# Patient Record
Sex: MALE | Race: ASIAN | NOT HISPANIC OR LATINO
[De-identification: names, ages, dates, MRNs, and addresses within clinical notes are randomized per-mention and may not be internally consistent; named-entity substitution may affect disease eponyms.]

---
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
## 2023-11-21 ENCOUNTER — EMERGENCY (EMERGENCY)
Facility: HOSPITAL | Age: 23
LOS: 0 days | Discharge: HOME | End: 2023-11-21
Attending: EMERGENCY MEDICINE
Payer: COMMERCIAL

## 2023-11-21 VITALS
SYSTOLIC BLOOD PRESSURE: 103 MMHG | RESPIRATION RATE: 19 BRPM | WEIGHT: 149.91 LBS | HEIGHT: 74 IN | HEART RATE: 71 BPM | OXYGEN SATURATION: 98 % | DIASTOLIC BLOOD PRESSURE: 71 MMHG | TEMPERATURE: 98 F

## 2023-11-21 VITALS
DIASTOLIC BLOOD PRESSURE: 72 MMHG | RESPIRATION RATE: 20 BRPM | OXYGEN SATURATION: 98 % | HEART RATE: 67 BPM | SYSTOLIC BLOOD PRESSURE: 114 MMHG | TEMPERATURE: 98 F
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
## 2023-11-21 DIAGNOSIS — M79.18 MYALGIA, OTHER SITE: ICD-10-CM

## 2023-11-21 DIAGNOSIS — S29.012A STRAIN OF MUSCLE AND TENDON OF BACK WALL OF THORAX, INITIAL ENCOUNTER: ICD-10-CM
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
## 2023-11-21 PROCEDURE — 99284 EMERGENCY DEPT VISIT MOD MDM: CPT

## 2023-11-21 RX ORDER — IBUPROFEN 200 MG
1 TABLET ORAL
Qty: 20 | Refills: 0
Start: 2023-11-21 | End: 2023-11-25

## 2023-11-21 RX ORDER — CYCLOBENZAPRINE HYDROCHLORIDE 10 MG/1
10 TABLET, FILM COATED ORAL ONCE
Refills: 0 | Status: COMPLETED | OUTPATIENT
Start: 2023-11-21 | End: 2023-11-21

## 2023-11-21 RX ORDER — CYCLOBENZAPRINE HYDROCHLORIDE 10 MG/1
1 TABLET, FILM COATED ORAL
Qty: 15 | Refills: 0
Start: 2023-11-21 | End: 2023-11-25

## 2023-11-21 RX ORDER — IBUPROFEN 200 MG
600 TABLET ORAL ONCE
Refills: 0 | Status: COMPLETED | OUTPATIENT
Start: 2023-11-21 | End: 2023-11-21

## 2023-11-21 RX ADMIN — Medication 600 MILLIGRAM(S): at 16:06

## 2023-11-21 RX ADMIN — CYCLOBENZAPRINE HYDROCHLORIDE 10 MILLIGRAM(S): 10 TABLET, FILM COATED ORAL at 16:06
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
## 2023-11-21 NOTE — ED PROVIDER NOTE - PATIENT PORTAL LINK FT
You can access the FollowMyHealth Patient Portal offered by [facility] by registering at the following website: [email or web address]. By joining [employer]’s FollowMyHealth portal, you will also be able to view your health information using other applications (apps) compatible with our system.
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
## 2024-10-21 ENCOUNTER — EMERGENCY (EMERGENCY)
Facility: HOSPITAL | Age: 24
LOS: 0 days | Discharge: HOME | End: 2024-10-21
Attending: EMERGENCY MEDICINE
Payer: COMMERCIAL

## 2024-10-21 VITALS
HEART RATE: 75 BPM | TEMPERATURE: 98 F | SYSTOLIC BLOOD PRESSURE: 137 MMHG | RESPIRATION RATE: 20 BRPM | WEIGHT: 154.98 LBS | OXYGEN SATURATION: 97 % | DIASTOLIC BLOOD PRESSURE: 82 MMHG | HEIGHT: 74 IN

## 2024-10-21 VITALS
SYSTOLIC BLOOD PRESSURE: 128 MMHG | DIASTOLIC BLOOD PRESSURE: 80 MMHG | RESPIRATION RATE: 19 BRPM | OXYGEN SATURATION: 98 % | HEART RATE: 71 BPM | TEMPERATURE: 98 F

## 2024-10-21 DIAGNOSIS — R21 RASH AND OTHER NONSPECIFIC SKIN ERUPTION: ICD-10-CM

## 2024-10-21 LAB
APPEARANCE UR: CLEAR — SIGNIFICANT CHANGE UP
BILIRUB UR-MCNC: NEGATIVE — SIGNIFICANT CHANGE UP
COLOR SPEC: YELLOW — SIGNIFICANT CHANGE UP
DIFF PNL FLD: NEGATIVE — SIGNIFICANT CHANGE UP
GLUCOSE UR QL: NEGATIVE MG/DL — SIGNIFICANT CHANGE UP
KETONES UR-MCNC: 15 MG/DL
LEUKOCYTE ESTERASE UR-ACNC: NEGATIVE — SIGNIFICANT CHANGE UP
NITRITE UR-MCNC: NEGATIVE — SIGNIFICANT CHANGE UP
PH UR: 5.5 — SIGNIFICANT CHANGE UP (ref 5–8)
PROT UR-MCNC: NEGATIVE MG/DL — SIGNIFICANT CHANGE UP
SP GR SPEC: 1.02 — SIGNIFICANT CHANGE UP (ref 1–1.03)
UROBILINOGEN FLD QL: 0.2 MG/DL — SIGNIFICANT CHANGE UP (ref 0.2–1)

## 2024-10-21 PROCEDURE — 99284 EMERGENCY DEPT VISIT MOD MDM: CPT

## 2024-10-21 RX ORDER — DOXYCYCLINE HYCLATE 100 MG
1 CAPSULE ORAL
Qty: 14 | Refills: 0
Start: 2024-10-21 | End: 2024-10-27

## 2024-10-21 RX ORDER — CEFTRIAXONE SODIUM 1 G
500 VIAL (EA) INJECTION ONCE
Refills: 0 | Status: COMPLETED | OUTPATIENT
Start: 2024-10-21 | End: 2024-10-21

## 2024-10-21 RX ORDER — DOXYCYCLINE HYCLATE 100 MG
100 CAPSULE ORAL ONCE
Refills: 0 | Status: COMPLETED | OUTPATIENT
Start: 2024-10-21 | End: 2024-10-21

## 2024-10-21 RX ADMIN — Medication 500 MILLIGRAM(S): at 18:51

## 2024-10-21 RX ADMIN — Medication 100 MILLIGRAM(S): at 18:51

## 2024-10-21 NOTE — ED PROVIDER NOTE - NSFOLLOWUPINSTRUCTIONS_ED_ALL_ED_FT
Safe Sex Practices    WHAT YOU NEED TO KNOW:    What are safe sex practices? Safe sex practices are ways to prevent pregnancy and the spread of sexually transmitted infections (STIs). An STI happens when a virus or bacteria are spread through sexual activity. Safe sex practices help decrease or prevent body fluid exchange during sex. Body fluids include saliva, urine, blood, vaginal fluids, and semen. Oral, vaginal, and anal sex can all spread STIs.    What are some safe sex practices to follow before I have sex?    Talk to a new partner before you have sex. Tell your partner if you have an STI. Ask about his or her sex history and if he or she has a current or past STI. Your partner may need to be tested and treated. Do not have sex while you are being treated for an STI, or with a partner who is being treated.    Limit your number of sex partners. More than one sex partner can increase your risk for an STI. Do not have sex with anyone whose sex history you do not know.    Get tested for STIs if needed. Get tested if you had sex with someone who has an STI. Get tested if you have unprotected sex with any new partner.    Talk to your healthcare provider about birth control. Birth control can help prevent an unwanted pregnancy. There are many different types of birth control. Talk to your healthcare provider about which birth control method is right for you.    Ask about medicines to lower your risk for some STIs:  Vaccines can help protect you from hepatitis A, hepatitis B, and the human papillomavirus (HPV). The HPV vaccine is usually given at 11 years, but it may be given through 26 years to both females and males. Your provider can give you more information on vaccines to prevent STIs.    Pre-exposure prophylaxis (PrEP) may be given if you are at high risk for HIV. PrEP is taken every day to prevent the virus from fully infecting the body.    Do not use alcohol or drugs before sex. These can prevent you from thinking clearly and increase your risk for unsafe sex.  What are some safe sex practices to follow while I am having sex?    Use condoms and barrier methods for all types of sexual contact. This includes oral, vaginal, and anal sex. Male and female condoms are available. Make sure that the condom fits and is put on correctly. Rubber latex sheets or dental dams can be used for oral sex. Use a new condom or latex barrier each time you have sex. Use latex condoms, if possible. [place] or natural condoms do not prevent STIs. If you or your partner is allergic to latex, use a nonlatex product, such as polyurethane. Use a second form of birth control with the condom to prevent pregnancy and STIs. Do not use male and female condoms together.    Only use water-based lubricants during sex. Water-based lubricants help prevent sores or cuts in the vagina or on the penis. Prevent sores or cuts to decrease your risk for an STI. Do not use oil-based lubricants, such as baby oil or hand lotion, with latex condoms or barriers. These will weaken the latex and may cause the condom to break.    Do not use chemicals that irritate your skin. Products that contain chemical irritants, such as spermicides, can irritate the lining of your vagina or rectum. Irritation may cause sores that can increase your risk for an STI.    Be careful when you have sex if you have open sores or cuts. Open sores or cuts may increase your risk for an STI. Keep all open sores or cuts covered during sex. Do not have oral sex if you have cuts or sores in your mouth.    Do not do activities that can pass germs. Do not use saliva as a lubricant or share sex toys.  Where can I find more information?    [employer] ([employer])  [address] [address]  [place] [address]  Web Address: http://www.ashastd.org  When should I seek immediate care?    A condom breaks, leaks, or slips off while you are having sex.    You notice sores on your penis, vagina, anal area, or the skin around them.    You have had unsafe sex and want to discuss emergency contraception or treatment for STI exposure.  When should I call my doctor?    You think you or your female sex partner might be pregnant.    You have questions or concerns about your condition or care.  CARE AGREEMENT:    You have the right to help plan your care. Learn about your health condition and how it may be treated. Discuss treatment options with your healthcare providers to decide what care you want to receive. You always have the right to refuse treatment.

## 2024-10-21 NOTE — ED PROVIDER NOTE - OBJECTIVE STATEMENT
24-year-old male with no significant past medical history presenting ER due to penile rash noted x 2 to 3 days.  Patient states that he has already had seen his primary and had RPR testing as well as HIV but no hepatitis testing.  Patient states that he is worried that the penile rash/lesion is from herpes.  Otherwise denies any pain or discomfort around penis but does states that he feels like there is some Recom/discharge from the penile meatus.

## 2024-10-21 NOTE — ED PROVIDER NOTE - CLINICAL SUMMARY MEDICAL DECISION MAKING FREE TEXT BOX
Patient with penile rash otherwise with noted bumps on otherwise will DC with outpatient neurology follow-up as needed.  Given hepatitis testing as well as treatment for possible chlamydia and gonorrhea with ceftriaxone and Doxy in ED.  Advised patient to get further testing or evaluation should he need to with a urologist.  Given rash on penis is nontender with no redness unlikely to be herpes but will test for it via bloodwork at this time. May do Tzanck smear as well outpt.

## 2024-10-21 NOTE — ED ADULT TRIAGE NOTE - CHIEF COMPLAINT QUOTE
Requesting STDs check,  herpes check ,c/o redness to penis no discharge, c/o fever and chills a few days ago. Recent un protected sex .

## 2024-10-21 NOTE — ED PROVIDER NOTE - PATIENT PORTAL LINK FT
You can access the FollowMyHealth Patient Portal offered by [facility] by registering at the following website: [email or web address]. By joining [employer]’s FollowMyHealth portal, you will also be able to view your health information using other applications (apps) compatible with our system.

## 2024-10-22 ENCOUNTER — APPOINTMENT (OUTPATIENT)
Dept: ORTHOPEDIC SURGERY | Facility: CLINIC | Age: 24
End: 2024-10-22

## 2024-10-22 LAB
C TRACH RRNA SPEC QL NAA+PROBE: SIGNIFICANT CHANGE UP
HCV AB S/CO SERPL IA: 0.15 S/CO — SIGNIFICANT CHANGE UP (ref 0–0.99)
HCV AB SERPL-IMP: SIGNIFICANT CHANGE UP
HSV 1/2 SOURCE: SIGNIFICANT CHANGE UP
HSV1 DNA BLD QL NAA+PROBE: SIGNIFICANT CHANGE UP
HSV1 IGG SER-ACNC: 0.1 INDEX — SIGNIFICANT CHANGE UP
HSV1 IGG SERPL QL IA: NEGATIVE — SIGNIFICANT CHANGE UP
HSV2 DNA BLD QL NAA+PROBE: SIGNIFICANT CHANGE UP
HSV2 IGG FLD-ACNC: 1.29 INDEX — HIGH
HSV2 IGG SERPL QL IA: POSITIVE
N GONORRHOEA RRNA SPEC QL NAA+PROBE: SIGNIFICANT CHANGE UP
SPECIMEN SOURCE: SIGNIFICANT CHANGE UP

## 2024-10-23 LAB
HAV IGM SER-ACNC: SIGNIFICANT CHANGE UP
HBV CORE IGM SER-ACNC: SIGNIFICANT CHANGE UP
HBV SURFACE AG SER-ACNC: SIGNIFICANT CHANGE UP
HCV AB S/CO SERPL IA: 0.15 S/CO — SIGNIFICANT CHANGE UP (ref 0–0.99)
HCV AB SERPL-IMP: SIGNIFICANT CHANGE UP

## 2024-12-11 ENCOUNTER — APPOINTMENT (OUTPATIENT)
Dept: ORTHOPEDIC SURGERY | Facility: CLINIC | Age: 24
End: 2024-12-11

## 2025-01-14 NOTE — ED ADULT TRIAGE NOTE - BP NONINVASIVE SYSTOLIC (MM HG)
103 The patient has been re-examined and I agree with the above assessment or I updated with my findings.